# Patient Record
Sex: FEMALE | Race: WHITE | NOT HISPANIC OR LATINO | URBAN - METROPOLITAN AREA
[De-identification: names, ages, dates, MRNs, and addresses within clinical notes are randomized per-mention and may not be internally consistent; named-entity substitution may affect disease eponyms.]

---

## 2018-11-05 ENCOUNTER — OFFICE VISIT (OUTPATIENT)
Dept: URBAN - METROPOLITAN AREA CLINIC 32 | Facility: CLINIC | Age: 68
End: 2018-11-05
Payer: MEDICARE

## 2018-11-05 DIAGNOSIS — H04.123 DRY EYE SYNDROME OF BILATERAL LACRIMAL GLANDS: ICD-10-CM

## 2018-11-05 PROCEDURE — 67028 INJECTION EYE DRUG: CPT | Performed by: OPHTHALMOLOGY

## 2018-11-05 PROCEDURE — 92134 CPTRZ OPH DX IMG PST SGM RTA: CPT | Performed by: OPHTHALMOLOGY

## 2018-11-05 PROCEDURE — 99213 OFFICE O/P EST LOW 20 MIN: CPT | Performed by: OPHTHALMOLOGY

## 2018-11-05 ASSESSMENT — INTRAOCULAR PRESSURE
OD: 19
OS: 20

## 2018-11-05 NOTE — IMPRESSION/PLAN
Impression: Trib rtnl vein occlusion, left eye, with macular edema: R09.4659. OS. Condition: improving. Vision: vision affected. last AV OS 9/28/2018 01/05/2018, s/p PRP OS 01/24/2018  w/Dr. Neetu Condon Hx of AV OS x 09/2017 with Dr. Osvaldo Sims in [place] and laser tx OS 1 wk later w/Dr. Osvaldo Sims Plan: Discussed diagnosis in detail with patient. Discussed risks of progression. Based on today's exam, diagnostic studies and review of records, recommend to continue with CHILANGO tx [facility] in [facility] in order to help reduce the swelling and prevent a further reduction in vision. An examination that was significantly and separately identifiable from the procedure was performed today. Discussed the risks and benefits of tx. Patient elects to proceed with recommendation. OCT shows minimal edema Patient may need additional CHILANGO tx or laser treatment in the future.

## 2018-12-17 ENCOUNTER — OFFICE VISIT (OUTPATIENT)
Dept: URBAN - METROPOLITAN AREA CLINIC 32 | Facility: CLINIC | Age: 68
End: 2018-12-17
Payer: MEDICARE

## 2018-12-17 PROCEDURE — 92134 CPTRZ OPH DX IMG PST SGM RTA: CPT | Performed by: OPHTHALMOLOGY

## 2018-12-17 PROCEDURE — 99213 OFFICE O/P EST LOW 20 MIN: CPT | Performed by: OPHTHALMOLOGY

## 2018-12-17 ASSESSMENT — INTRAOCULAR PRESSURE
OD: 20
OS: 20

## 2018-12-17 NOTE — IMPRESSION/PLAN
Impression: Trib rtnl vein occlusion, left eye, with macular edema: Z55.0761. OS. Condition: improving. Vision: vision affected. last AV OS 11/05/2018,  AV OS 01/05/2018, s/p PRP OS 01/24/2018  w/Dr. Cedrick Long Hx of AV OS x 09/2017 with Dr. Briseno in [place] and laser tx OS 1 wk later w/Dr. Briseno Plan: Discussed diagnosis in detail with patient. No treatment is required at this time based on exam and OCT. Recommend observation for now. Will reassess condition in 6 wks. OCT shows a small amount of edema centrally decreasing OS.

## 2019-01-28 ENCOUNTER — OFFICE VISIT (OUTPATIENT)
Dept: URBAN - METROPOLITAN AREA CLINIC 32 | Facility: CLINIC | Age: 69
End: 2019-01-28
Payer: MEDICARE

## 2019-01-28 PROCEDURE — 99213 OFFICE O/P EST LOW 20 MIN: CPT | Performed by: OPHTHALMOLOGY

## 2019-01-28 PROCEDURE — 92134 CPTRZ OPH DX IMG PST SGM RTA: CPT | Performed by: OPHTHALMOLOGY

## 2019-01-28 ASSESSMENT — INTRAOCULAR PRESSURE
OD: 18
OS: 19

## 2019-01-28 NOTE — IMPRESSION/PLAN
Impression: Trib rtnl vein occlusion, left eye, with macular edema: I47.1957. OS. Condition: stabilizing. Vision: vision affected. last AV OS 11/05/2018,  AV OS 01/05/2018, s/p PRP OS 01/24/2018  w/Dr. Osiel Garnett Hx of AV OS x 09/2017 with Dr. Juan Ramos in [place] and laser tx OS 1 wk later w/Dr. Juan Ramos Plan: Discussed diagnosis in detail with patient. No treatment is required at this time based on exam and OCT. Recommend observation for now. Will reassess condition in 4 wks. OCT shows mild ILM / ERM change, subfoveal thickening, minimal edema OS.

## 2019-02-25 ENCOUNTER — OFFICE VISIT (OUTPATIENT)
Dept: URBAN - METROPOLITAN AREA CLINIC 32 | Facility: CLINIC | Age: 69
End: 2019-02-25
Payer: MEDICARE

## 2019-02-25 PROCEDURE — 99213 OFFICE O/P EST LOW 20 MIN: CPT | Performed by: OPHTHALMOLOGY

## 2019-02-25 PROCEDURE — 92134 CPTRZ OPH DX IMG PST SGM RTA: CPT | Performed by: OPHTHALMOLOGY

## 2019-02-25 ASSESSMENT — INTRAOCULAR PRESSURE
OS: 28
OD: 26

## 2019-02-25 NOTE — IMPRESSION/PLAN
Impression: Age-related nuclear cataract, bilateral: H25.13. OU. Condition: stable. Vision: vision not affected. Plan: Cataracts account for the patient's complaints. No treatment currently recommended. The patient will monitor vision changes and contact us with any decrease in vision.

## 2019-02-25 NOTE — IMPRESSION/PLAN
Impression: Trib rtnl vein occlusion, left eye, with macular edema: F80.6431. OS. Condition: stabilizing. Vision: vision affected. last AV OS 11/05/2018,  AV OS 01/05/2018, s/p PRP OS 01/24/2018  w/Dr. Flora Miranda Hx of AV OS x 09/2017 with Dr. Char Clay in [place] and laser tx OS 1 wk later w/Dr. Char Clay Plan: Discussed diagnosis in detail with patient. No treatment is required at this time based on exam and OCT. Recommend observation for now. Will reassess condition in 8 wks. OCT shows mild ILM / ERM change decrease edema OS.

## 2019-04-04 ENCOUNTER — OFFICE VISIT (OUTPATIENT)
Dept: URBAN - METROPOLITAN AREA CLINIC 29 | Facility: CLINIC | Age: 69
End: 2019-04-04

## 2019-04-04 ASSESSMENT — VISUAL ACUITY
OS: 20/200
OD: 20/20

## 2019-04-22 ENCOUNTER — OFFICE VISIT (OUTPATIENT)
Dept: URBAN - METROPOLITAN AREA CLINIC 32 | Facility: CLINIC | Age: 69
End: 2019-04-22
Payer: MEDICARE

## 2019-04-22 DIAGNOSIS — H34.8320 TRIB RTNL VEIN OCCLUSION, LEFT EYE, WITH MACULAR EDEMA: Primary | ICD-10-CM

## 2019-04-22 PROCEDURE — 99213 OFFICE O/P EST LOW 20 MIN: CPT | Performed by: OPHTHALMOLOGY

## 2019-04-22 PROCEDURE — 92134 CPTRZ OPH DX IMG PST SGM RTA: CPT | Performed by: OPHTHALMOLOGY

## 2019-04-22 ASSESSMENT — INTRAOCULAR PRESSURE
OD: 20
OS: 20

## 2019-04-22 NOTE — IMPRESSION/PLAN
Impression: Trib rtnl vein occlusion, left eye, with macular edema: Y20.4692. OS. Condition: stable. Vision: vision affected. last AV OS 11/05/2018,  AV OS 01/05/2018, s/p PRP OS 01/24/2018  w/Dr. Jessica Andres Hx of AV OS x 09/2017 with Dr. Arturo Dyer in [place] and laser tx OS 1 wk later w/Dr. Arturo Dyer Plan: Discussed diagnosis in detail with patient. No treatment is required at this time based on exam and OCT. Patient is returning to [place] and will continue her eye care with Dr Arturo Dyer. OCT OS shows a minimal decrease in CMT - stable.

## 2021-02-11 ENCOUNTER — OFFICE VISIT (OUTPATIENT)
Dept: URBAN - METROPOLITAN AREA CLINIC 29 | Facility: CLINIC | Age: 71
End: 2021-02-11
Payer: MEDICARE

## 2021-02-11 DIAGNOSIS — H52.223 REGULAR ASTIGMATISM, BILATERAL: ICD-10-CM

## 2021-02-11 DIAGNOSIS — H25.13 AGE-RELATED NUCLEAR CATARACT, BILATERAL: ICD-10-CM

## 2021-02-11 PROCEDURE — 92134 CPTRZ OPH DX IMG PST SGM RTA: CPT | Performed by: OPTOMETRIST

## 2021-02-11 PROCEDURE — 92014 COMPRE OPH EXAM EST PT 1/>: CPT | Performed by: OPTOMETRIST

## 2021-02-11 ASSESSMENT — INTRAOCULAR PRESSURE
OS: 20
OD: 20

## 2021-02-11 ASSESSMENT — VISUAL ACUITY
OS: 20/200
OD: 20/25

## 2021-02-11 NOTE — IMPRESSION/PLAN
Impression: Tributary (branch) retinal vein occlusion, left eye, with macular edema: X01.4888. Condition: established, stable. Plan: Discussed diagnosis in detail with patient. No treatment is required at this time. Will continue to observe condition and or symptoms. Reassured patient of current condition and treatment. Call if Symptoms occur.

## 2021-02-11 NOTE — ASSESSMENT/PLAN
Impression: OCT MAC - OD: Good-; OS: Good- Plan: Normal macula appearance without signs of disease OD. BRVO OS-macular edema scarring.

## 2021-02-11 NOTE — IMPRESSION/PLAN
Impression: Age-related nuclear cataract, bilateral: H25.13 OU. Condition: established, stable. Plan: Discussed diagnosis in detail with patient. No treatment is required at this time. Patient will update glasses RX first. Will continue to observe condition and or symptoms.

## 2022-10-19 ENCOUNTER — OFFICE VISIT (OUTPATIENT)
Dept: URBAN - METROPOLITAN AREA CLINIC 28 | Facility: CLINIC | Age: 72
End: 2022-10-19
Payer: MEDICARE

## 2022-10-19 DIAGNOSIS — H52.223 REGULAR ASTIGMATISM, BILATERAL: ICD-10-CM

## 2022-10-19 DIAGNOSIS — H34.8322 TRIBUTARY (BRANCH) RETINAL VEIN OCCLUSION, LEFT EYE, STABLE: Primary | ICD-10-CM

## 2022-10-19 DIAGNOSIS — H25.813 COMBINED FORMS OF AGE-RELATED CATARACT, BILATERAL: ICD-10-CM

## 2022-10-19 PROCEDURE — 92134 CPTRZ OPH DX IMG PST SGM RTA: CPT | Performed by: OPTOMETRIST

## 2022-10-19 PROCEDURE — 92014 COMPRE OPH EXAM EST PT 1/>: CPT | Performed by: OPTOMETRIST

## 2022-10-19 ASSESSMENT — VISUAL ACUITY
OS: 20/200
OD: 20/20

## 2022-10-19 ASSESSMENT — INTRAOCULAR PRESSURE
OD: 18
OS: 18

## 2022-10-19 NOTE — IMPRESSION/PLAN
Impression: Tributary (branch) retinal vein occlusion, left eye, stable: L99.1380. Condition: established, stable. Plan: Discussed diagnosis in detail with patient. OPTOS ordered and reviewed. No treatment is required at this time. Will continue to observe condition and or symptoms. Reassured patient of current condition and treatment. Call if Symptoms occur.

## 2022-11-29 ENCOUNTER — OFFICE VISIT (OUTPATIENT)
Dept: URBAN - METROPOLITAN AREA CLINIC 28 | Facility: CLINIC | Age: 72
End: 2022-11-29
Payer: MEDICARE

## 2022-11-29 DIAGNOSIS — S05.02XA CORNEAL ABRASION W/O FB OF LEFT EYE, INITIAL ENCOUNTER: Primary | ICD-10-CM

## 2022-11-29 PROCEDURE — 99213 OFFICE O/P EST LOW 20 MIN: CPT | Performed by: OPTOMETRIST

## 2022-11-29 NOTE — IMPRESSION/PLAN
Impression: Corneal abrasion w/o FB of left eye, initial encounter: S05.02xA. Plan: Discussed diagnosis in detail with patient. Discussed treatment options with patient. Patient instructed to use artificial tears as instructed. Use ointment at night as instructed. Will continue to observe condition and or symptoms. Patient instructed to call if condition gets worse.

## 2023-10-20 ENCOUNTER — OFFICE VISIT (OUTPATIENT)
Dept: URBAN - METROPOLITAN AREA CLINIC 28 | Facility: CLINIC | Age: 73
End: 2023-10-20
Payer: MEDICARE

## 2023-10-20 DIAGNOSIS — H25.813 COMBINED FORMS OF AGE-RELATED CATARACT, BILATERAL: ICD-10-CM

## 2023-10-20 DIAGNOSIS — H34.8322 TRIBUTARY (BRANCH) RETINAL VEIN OCCLUSION, LEFT EYE, STABLE: Primary | ICD-10-CM

## 2023-10-20 PROCEDURE — 92014 COMPRE OPH EXAM EST PT 1/>: CPT | Performed by: OPTOMETRIST

## 2023-10-20 ASSESSMENT — INTRAOCULAR PRESSURE
OD: 20
OS: 20

## 2023-10-26 ENCOUNTER — OFFICE VISIT (OUTPATIENT)
Dept: URBAN - METROPOLITAN AREA CLINIC 28 | Facility: CLINIC | Age: 73
End: 2023-10-26

## 2023-10-26 DIAGNOSIS — H52.223 REGULAR ASTIGMATISM, BILATERAL: Primary | ICD-10-CM

## 2023-10-26 ASSESSMENT — VISUAL ACUITY
OD: 20/20
OS: 20/200